# Patient Record
Sex: FEMALE | Race: BLACK OR AFRICAN AMERICAN | Employment: OTHER | URBAN - METROPOLITAN AREA
[De-identification: names, ages, dates, MRNs, and addresses within clinical notes are randomized per-mention and may not be internally consistent; named-entity substitution may affect disease eponyms.]

---

## 2018-09-28 ENCOUNTER — HOSPITAL ENCOUNTER (OUTPATIENT)
Dept: PHYSICAL THERAPY | Age: 58
Discharge: HOME | End: 2018-09-28
Payer: OTHER GOVERNMENT

## 2018-09-28 VITALS — BODY MASS INDEX: 41.59 KG/M2 | WEIGHT: 265 LBS | HEIGHT: 67 IN

## 2018-09-28 PROCEDURE — 97162 PT EVAL MOD COMPLEX 30 MIN: CPT

## 2018-09-28 PROCEDURE — 97110 THERAPEUTIC EXERCISES: CPT

## 2018-09-28 PROCEDURE — 97140 MANUAL THERAPY 1/> REGIONS: CPT

## 2018-09-28 NOTE — PROGRESS NOTES
[facility]  Physical Therapy Lymphedema Clinic  [address] [facility], [address]  [facility], [address]    INITIAL EVALUATION    NAME: Jarvis Shen AGE: 63 y.o. 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 She had a mastectomy and then a complete dissection of R axillary lymph nodes. 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  Pt was treated with chemotherapy and will have radiation in the near future. She had chemo and radiation back in 2005 as well. She was seen by her surgeon this am and he was able to compress and drain an area along R anterior chest.  This area has been covered with sterile dry dressing and nugauze packing and pt was instructed to change the dressing as it requires. Pt was issued a velcro compression sleeve for the R UE but not any hand pieces from the [place]. She brought the garment in for sizing today. Lymphedema Life Impact Scale: Score of 4 and impairment percentage of 6%. See scanned document. Past Medical History: No past medical history on file. No past surgical history on file. Current Medications:    No current outpatient prescriptions on file. No current facility-administered medications for this encounter. see scanned list below: Allergies: Allergies not on file.   None per patient report  Prior Level of Function/Social/Work History/Personal factors and/or comorbidities impacting plan of care: retired, recovering from 2 recent surgeries, dealing with an open wound that requires packing. Living Situation: pt's mother lives with her but requires care, pt does have her son at home who can assist if needed. Trainable Caregiver?: son possibly   Self-care/ADLs: mod I     Mobility: I   Sleeping Arrangement:  In a bed, likes to sleep on her R side but has been trying to lie on L side and on her back with her R UE elevated   Adaptive Equipment Owned: none   Other: n/a  Previous Therapy:  none  Compression/Lymphedema Equipment:  Pt was issued 2 farrow lite OTS arm velcro sleeve in size medium. Pt did not get any hand pieces however. These were issued by the [place]. SUBJECTIVE:   Pt reports that her R upper posterior arm is numb and it might be swollen. It is hard for her to tell due to decrease sensation in that area. Patients goals for therapy: \"to learn about lymphedema. \"    OBJECTIVE DATA SUMMARY:   EXAMINATION/PRESENTATION/DECISION MAKING:   Pain:  Pain Scale 1: Numeric (0 - 10)     Pain Intensity 1: 3     Pain Location 1: Chest     Pain Orientation 1: Right, Anterior     Patient Stated Pain Goal: 0       Self-Care and ADLs:  Grooming: Modified independent  Bathing: Modified independent for showers   UB Dressing: Modified independent  LB Dressing: Modified independent    Don/Doff Shoes/Socks: Independent  Toileting: Independent    Other: Pt fatigued from recent surgeries and chemotherapy      Skin and Tissue Assessment:  Dermal Status:  [x]   Intact-R UE []  Dry   []  Tenuous [] Flaky   [x]  Wound/lesion present-R breast.  Areas was seen by MD this am and expressed then packed with nugauze and sterile dressing to cover. Pt was instructed to change entire dressing and packing as the dressing gets wet.    [x]  Scars-R anterior chest and axilla   []  Dermatitis    Texture/Consistency:  [x]  Boggy-R upper arm []  Pitting Edema   []  Brawny []  Combination   []  Fibrotic/Woody Pigmentation/Color Change:  [x]  Normal []  Hemosiderin   []  Red []  Erythematous   []  Hyperpigmented []  Hyperlipodermatosclerosis   Anomalies:  []  Lymphorrhea []  Vesicles   []  Petechiae []  Warty Vercusis   []  Bullae []  Papilloma   Circulatory:  []  OSMANY []  Varicosities:   []  Pulses []  Vascular studies ruled out DVT in past   []  DVT History    Nails:  [x]   Normal  []   Fungus  Stemmers Sign: negative  Height:  Height: 5' 7\" (170.2 cm)  Weight:  Weight: 120.2 kg (265 lb)   BMI:  BMI (calculated): 41.5  (36 or greater: adversely affecting lymphedema)  Wound/Ulcer:  R breast incisional wound that is requiring a packing          Wound Pain:   3/10  Volumetric Measurements: UEs  Right:  3,358 mL Left:  3,167 mL   % Difference: 6% Dominance: R   (See scanned graph)  Range of Motion: Pt with limited end range of R shoulder flexion and abduction  Strength: WFL   Sensation:    [] Intact    [x] Impaired-R posterior upper arm  Mobility:    Bed/Chair Mobility:  Modified independent  Transfers:  Modified independent    Sitting Balance:  normal Standing Balance:  good   Gait:  Independent  Wheelchair Mobility:  Not tested   Endurance:  fair Stairs:  Not tested       Safety:  Patient is alert and oriented:  yes   Safety awareness:  good   Fall Risk?:  low   Patient given written fall prevention handout: Yes   Precautions:  Standard lymphedema precautions to include avoiding blood pressure readings, injections and IVs or other procedures/acts that could lead to broken skin on affected area, and avoiding excessive heat, resistive activity or altitude without compression garment    Functional Measure:   Based on the above components, the patient evaluation is determined to be of the following complexity level: MEDIUM    Evaluation Time: 11:30-12:15 pm    TREATMENT PROVIDED:   1. Treatment description:  Therapeutic Exercise and Procedure: Patient instructed in activity restrictions and exercise guidelines.   Therapist demonstrated deep abdominal breathing and a remedial lymphedema range of motion exercise program to be done in sitting. Patient was able to complete the routine times 5 reps and deep abdominal breathing with fair performance. Patient has been asked to complete breathing exercises and the decongestive exercise routine daily. Pt has been asked to use elevation as able and to walk up to 30 minutes per day as her endurance level will allow. Treatment time:  12:15-12:45 pm  Minutes: 30    2. Treatment description:  Manual Therapy: Patient instructed in skin care principles and anatomy of the lymphatic system. Therapist able to demonstrate manual lymphatic drainage techniques for the drainage of R UE and chest and skin care protocol. Pt states that her MD referred her for breast prosthesis and mastectomy bras. Appropriate bras were shown to patient and she was given the vendors in town:  [employer] and [employer]. Pt's compression garments was evaluated and it is the correct size. Unfortunately though is that is did not come with a hand piece. Pt has been advice not to use the garment without a handpiece as hand swelling could result. Pt desires to get into a sleeve and glove and not the velcro system if possible. Treatment time:  12:45-1 pm  Minutes: 15    ASSESSMENT:   Galilea Smith. Radha Miranda is a 63 y.o. female who presents with stage 0-1 secondary lymphedema of her R chest and UE. She has a wound that is requiring medical assistance and skin care at home. She is a risk for increase swelling with complete dissection of her R axilla and current history of breast CA. Patient will benefit from complete decongestive therapy (CDT) including manual lymphatic drainage (MLD) technique, short-stretch textile bandages/compression system to decongest limb, and kinesiotaping as appropriate.   Patient will receive instruction in proper skin care to recognize signs/symptoms of and prevent infection, therapeutic exercise, and self-MLD for independent home program and restorative lymphatic performance. This care is medically necessary due to the infection risk with lymphedema and to improve functional activities. CDT is necessary to resolve swelling to allow patient to return to wearing normal clothes and prevent worsening of symptoms, such as infections or hospitalizations. Patient will be independent with home program strategies to allow improved ADL ability and mobility and to allow patient to return to greatest functional independence. Rehabilitation potential is considered to be fair. Factors which may influence rehabilitation potential include recent surgery, upcoming radiation therapy on top of previous radiation and complete dissection of R axilla lymph nodes. Patient will benefit from 6-10 physical therapy visits over 12 weeks to optimize improvement in these areas. PLAN OF CARE:   Recommendations and Planned Interventions:  Manual lymph drainage/complete decongestive therapy  Multi-layer compression bandaging (short-stretch)  Compression garment fitting/provision  Lymphedema therapeutic exercise  AROM/PROM/Strength/Coordination  Self-care training  Functional mobility training  Education in skin care and lymphedema precautions  Self-MLD education per home program  Self-bandaging education per home program  Caregiver education as needed  Wound care as needed     GOALS  Short term goals  Time frame: to meet by 10/31/2018  1. Patient will demonstrate knowledge of signs/symptoms of infections/cellulitis and be independent in skin care to prevent cellulitis. 2.  Patient will demonstrate independence in lymphedema home program of therapeutic exercises to improve circulation and decongest limb to improve ADLs. 3.  Patient will participate in the selection process to allow ordering of home compression system (daytime, nighttime garments and pump as needed).      Long term goals  Time frame: 12/24/2018  1. Pt will show improvement in Lymphedema Life Impact Scale by decreasing impairment percentage to under 4% and thus allow improvement in patient's quality of life. 2.  Patient will be independent with don/doff of compression system and use in order to prevent reaccumulation of fluid at discharge. 3.  Pt will be independent in self-MLD and show stable limb volumes showing decongestion and pt. ready for transition to independent restorative phase of lymphedema therapy. Patient has participated in goal setting and agrees to work toward plan of care. Patient was instructed to call if questions or concerns arise. Thank you for this referral.  Maged Teresa, PT   Time Calculation: 90 mins    TREATMENT PLAN EFFECTIVE DATES:   9/28/2018 TO 12/24/2018  I have read the above plan of care for Kerrie Shanks. Lázaro Downey. I certify the above prescribed services are required by this patient and are medically necessary.   The above plan of care has been developed in conjunction with the lymphedema/physical therapist.       Physician Signature: ____________________________________Date:______________                                           Salvador Uribe NP

## 2018-10-02 ENCOUNTER — HOSPITAL ENCOUNTER (OUTPATIENT)
Dept: PHYSICAL THERAPY | Age: 58
Discharge: HOME | End: 2018-10-02
Payer: OTHER GOVERNMENT

## 2018-10-02 PROCEDURE — 97110 THERAPEUTIC EXERCISES: CPT

## 2018-10-02 PROCEDURE — 97140 MANUAL THERAPY 1/> REGIONS: CPT

## 2018-10-02 NOTE — ROUTINE PROCESS
[facility]  Physical Therapy Lymphedema Clinic  [address] [facility], [address], [facility].    LYmphedema Therapy  Visit: 2    [x]                  Daily note               []                 30 day/10th visit progress note    NAME: Galilea Miranda  DATE: 10/2/2018    GOALS  Short term goals  Time frame: to meet by 10/31/2018  1. Patient will demonstrate knowledge of signs/symptoms of infections/cellulitis and be independent in skin care to prevent cellulitis. Continued with education in skin care and infection prevention. Patient has open wound on R breast that continues to drain. Patient following recommendations per MD.   2.  Patient will demonstrate independence in lymphedema home program of therapeutic exercises to improve circulation and decongest limb to improve ADLs. Patient has been educated in the active range of motion routine and today educated in the stick routine. Patient encouraged to perform her exercises daily. 3.  Patient will participate in the selection process to allow ordering of home compression system (daytime, nighttime garments and pump as needed). Patient able to be measured for Juzo Soft Arm Sleeve and Medi [place] Glove/gauntlet today. Order has to be done through the [place] so information sent to patient's doctors office. Patient also interested in a pump trial so we will try to arrange this on an upcoming visit. Patient will set up an appointment with a local vendor for her truncal compression garments as recommended.      Long term goals  Time frame: 12/24/2018  1. Pt will show improvement in Lymphedema Life Impact Scale by decreasing impairment percentage to under 4% and thus allow improvement in patient's quality of life. 2.  Patient will be independent with don/doff of compression system and use in order to prevent reaccumulation of fluid at discharge. Educated in don/doff of day time compression garments.  Patient will need to work with her products when they arrive. 3.  Pt will be independent in self-MLD and show stable limb volumes showing decongestion and pt. ready for transition to independent restorative phase of lymphedema therapy. Began education in the Self MLD packet today and patient given a written packet to take home so that she can follow daily. Reviewed her volumes taken on evaluation. Girths of R UE taken today (see measurement section). Will continue to monitor. SUBJECTIVE REPORT: Patient arrived today eager to proceed with measuring and ordering her compression garments. Patient reporting that her wound on R breast is draining often and dressing changes are occurring 3 times a day. Pain:6/10 R breast wound   Gait:   [x]  Independent gait without any assistive device for community distances  ADLs: Independent   Treatment Response:   [x]   Patient reviewed packet received at evaluation  [x]   Patient completed home program as prescribed  []   Patient not fully compliant with home program to date  [x]   Patient measured compression garment arrival  Function:  [x]   Patient requiring less assistance with completion of home program  []   ADLs are requiring less assistance   []   Patient able to return to work/leisure activities  Lymphedema Life Impact Scale:Deferred  Weight: Deferred  TREATMENT AND OBJECTIVE DATA SUMMARY:   Patient/Family Education:      Educated in skin care: [x]   Skin care products  [x]   Hygiene  [x]  Prevention of cellulitis  [x]   Wound care (per MD recommendations)     Educated in exercise: [x]   Walking program  []   Carmen ball routine  [x]   Stick routine  [x]   ROM routine     Instructed in self MLD: Written sequence given and reviewed with patient as well as demonstration and instruction during MLD portion of the session.      Instructed in don/doff of compression system:   []   Multi layer bandage (MLB) donning principles and wear precautions  [x]   Day garments (Juzo Soft 2001 Arm Sleeve/Medi Gauntlet and Glove)  []   Night garments     Therapeutic Activity 0 minutes   Treatment time: 0  Functional Mobility: Independent   Fall risk: Low     Therapeutic Exercise/Procedure 10 minutes   Treatment time: 11:30am-11:40am  Carmen ball exercise program: Deferred   Stick exercise program: Educated patient on the stick routine while she was trying the [employer] and [employer] in the clinic. Patient able to perform the routine x 5 reps. Patient to follow this routine in the home as tolerated. Free exercises/ROM: Patient has been performing daily following her handouts. Home program: Patient to perform daily to BID:  [x]   Skin care  [x]   Deep abdominal breathing  [x]   Exercise routine  [x]   Walking program  [x]   Rest in supine   []   Compression bandage  [x]   Compression garments  [x]   Vasopneumatic device (will set up a demo)  [x]   Wound care  [x]   Self MLD  [x]   Bring supplies to each therapy visit  []   Purchase necessary supplies  []   Weight loss program  []   Follow up with     Rationale: Exercise will increase the lymph angiomotoricity and tissue pressure of the skin and thus decrease swelling. Modalities 0 minutes   Treatment time: 0  Vasopneumatic pump: Will set up a pump trial on an upcoming visit. Manual Lymphatic Drainage (MLD)  70 minutes   Treatment time: 11:10am-11:30am and 11:40am-12:30pm   Area to decongest:    [x] UE          [x]  Right     []  Left      [] Trunk    [] LE             []  Right     []  Left      [] Trunk     Sequence used and effectiveness: [x] Secondarysequence for upper extremity withinvolvement    [] Secondary/Primary sequence for lower extremities with / without trunk involvement     [x] Modifications were made to manual lymph drainage sequence to exclude cervical techniques secondary to patient's medical     [x] Self MLD sequence/techniques/hand strokes   Skin/wound care/debridement: Intact R UE.    Wound/lesion present-R breast. Area covered and patient is keeping area covered and treated per MD recommendations. She reports that it drains frequently and that she typically has some one change the dressing 3 times a day. Upper/Lower extremity compression: Discussed options for compression garments. Patient has 2 velcro sleeves that she has already received from the [place], but they did not come with hand pieces and she does not like the product. Today patient was measured for sleeve/glove/gauntlet per her choice and will fit best at this time in [address] [place] Arm Sleeve Size 4 Max/Long with silicone border 62-78 mmHg in black (2) and Medi [place] Glove 20-30 mmHg size 4 in black and Medi [place] Gauntlet 20-30 mmHg size 4. All products have to be ordered through prosthetics at the [place]. The above garments are what we are recommending for patient. [facility] and order[facility]fit products. Ideally our clinic therapist would be more involved in this process. Kinesiotaping: Deferred   Girth/Volume measurement: Reviewed results of volumetric measurements taken on evaluation. TOTAL TREATMENT 80 mins     Circumferential Limb Measurements:  Affected Side: R UE   Right (cm)   Base 3rd finger 6.5      Palm 20.9      Wrist 18      Mid Forearm 26.8      Elbow 29.5      Axilla 38.5 (mid bicep=33.5)      Length 46.5            ASSESSMENT:   Treatment effectiveness and tolerance: Patient continues with open wound on the R breast that requires frequent dressing changes. Patient reports that she is following recommendations from her MD on treating the area. She was able to proceed with measuring for her day time garments today, learning Self MLD and exercise. Will have patient return next week for follow up to see how she is progressing with her home program. Will try to set up a pump trial on an upcoming visit. Progress toward goals: See goal section.       PLAN OF CARE:   Changes to the plan of care: Continue with plan of care established at evaluation. Order for garments sent to physicians office because patient's order has to be processed by prosthetics at the [place]. Frequency: []  2 times a week  [x]  Weekly  []  Biweekly  []  Monthly   Other: Measured for garments today. Patient's insurance requires garments be ordered by physician through the prosthetics department at the [place] so this may cause a delay. Fax of requested garments sent to Ольга Ng NP.         Wilfrido Esqueda, PTA, CLT

## 2018-10-10 ENCOUNTER — HOSPITAL ENCOUNTER (OUTPATIENT)
Dept: PHYSICAL THERAPY | Age: 58
Discharge: HOME | End: 2018-10-10
Payer: OTHER GOVERNMENT

## 2018-10-10 VITALS — HEIGHT: 67 IN | WEIGHT: 256 LBS | BODY MASS INDEX: 40.18 KG/M2

## 2018-10-10 PROCEDURE — 97140 MANUAL THERAPY 1/> REGIONS: CPT

## 2018-10-10 PROCEDURE — 97016 VASOPNEUMATIC DEVICE THERAPY: CPT

## 2018-10-10 NOTE — ROUTINE PROCESS
[facility]  [address], [address]    LYmphedema Therapy  Visit: 3    [x]                  Daily note               []                 30 day/10th visit progress note    NAME: Kerrie Shanks. Lázaro Shayan  DATE: 10/10/2018  GOALS  Short term goals  Time frame: to meet by 10/31/2018  1. Patient will demonstrate knowledge of signs/symptoms of infections/cellulitis and be independent in skin care to prevent cellulitis. Continued with education in skin care and infection prevention. Patient has open wound on R breast that continues to drain. Patient following recommendations per MD.   Pt to call MD if wound is not improving. 2.  Patient will demonstrate independence in lymphedema home program of therapeutic exercises to improve circulation and decongest limb to improve ADLs. Patient has been educated in the active range of motion routine and in the stick routine. Patient encouraged to perform her exercises daily. 3.  Patient will participate in the selection process to allow ordering of home compression system (daytime, nighttime garments and pump as needed). Patient able to be measured for [employer] Soft Arm Sleeve and Medi [place] Glove/gauntlet. Order has been sent to the [place]. Patient completed a demo of the [address] [facility] [employer] today with good results. Recommend flexitouchplus for home use.      Long term goals  Time frame: 12/24/2018  1. Pt will show improvement in Lymphedema Life Impact Scale by decreasing impairment percentage to under 4% and thus allow improvement in patient's quality of life. 2.  Patient will be independent with don/doff of compression system and use in order to prevent reaccumulation of fluid at discharge. Educated in don/doff of day time compression garments. Patient will need to work with her products when they arrive.    3.  Pt will be independent in self-MLD and show stable limb volumes showing decongestion and pt. ready for transition to independent restorative phase of lymphedema therapy. Education in the Self MLD packet continues and pre and post measurement with the flexitouch treatment are included below. SUBJECTIVE REPORT: Patient reports that the wound on her breast continues to need daily wound care and it appears to not be closing in per her report. She has not heard anything about her garments. Pain: minimal discomfort in her R chest area close to the wound site. Gait:   [x]  Independent gait without any assistive device for community distances  ADLs: Independent   Treatment Response:   [x]   Patient reviewed packet received at evaluation  [x]   Patient completed home program as prescribed  []   Patient not fully compliant with home program to date  [x]   Patient awaiting compression day garments  Function:  [x]   Patient requiring less assistance with completion of home program  []   ADLs are requiring less assistance   []   Patient able to return to work/leisure activities  Lymphedema Life Impact Scale:Deferred  Weight: deferred today  TREATMENT AND OBJECTIVE DATA SUMMARY:   Patient/Family Education:      Educated in skin care: [x]   Skin care products  [x]   Hygiene  [x]  Prevention of cellulitis  [x]   Wound care (per MD recommendations)     Educated in exercise: [x]   Walking program  []   Carmen ball routine  [x]   Stick routine  [x]   ROM routine     Instructed in self MLD: Written sequence given and reviewed with patient as well as demonstration and instruction during MLD portion of the session. Instructed in don/doff of compression system:   []   Multi layer bandage (MLB) donning principles and wear precautions  [x]   Day garments (Juzo Soft 2001 Arm Sleeve/Medi Gauntlet and Glove)  [x]   Night garments deferred today, flexitouch use and benefits discussed today.      Therapeutic Activity 0 minutes   Treatment time: 0  Functional Mobility: Independent   Fall risk: Low Therapeutic Exercise/Procedure minutes   Treatment time: n/a  Carmen ball exercise program: Deferred   Stick exercise program: Pt has performed in the clinic and has the written handouts to follow at home. Free exercises/ROM: Patient has performed in the clinic and has the written handouts to follow at home. Home program: Patient to perform daily to BID:  [x]   Skin care  [x]   Deep abdominal breathing  [x]   Exercise routine  [x]   Walking program  [x]   Rest in supine   []   Compression bandage  [x]   Compression garments  [x]   Vasopneumatic device (when available in the home setting)  [x]   Wound care  [x]   Self MLD  [x]   Bring supplies to each therapy visit  []   Purchase necessary supplies  []   Weight loss program  [x]   Follow up with MD for R breast wound     Rationale: Exercise will increase the lymph angiomotoricity and tissue pressure of the skin and thus decrease swelling. Modalities 50 minutes   Treatment time: 10:15-10:55 am  Vasopneumatic pump: Pt was educated in use and benefit of the flexitouch plus device. The rep was present and was able to fit the device to the patient. Pre and post measurements were taken (see below)  and good benefit noted from use of the device. Pt was positioned in supine with her R UE elevated on 2 pillows. MLD performed on short neck and deep abd breathing exercises done. Recommended the device for home use.      Manual Lymphatic Drainage (MLD)  55 minutes   Treatment time: 10:00-10:20 am and 10:55-11:30 am   Area to decongest:    [x] UE          [x]  Right     []  Left      [] Trunk    [] LE             []  Right     []  Left      [] Trunk     Sequence used and effectiveness: [x] Secondarysequence for upper extremity with trunk involvement    [] Secondary/Primary sequence for lower extremities with / without trunk involvement     [x] Modifications were made to manual lymph drainage sequence to exclude cervical techniques secondary to patient's medical [x] Self MLD sequence/techniques/hand strokes   Skin/wound care/debridement: Intact R UE. Wound/lesion present-R breast. Area covered and patient is keeping area covered and treated per MD recommendations. She reports that it drains frequently and that she typically has someone change the dressing 3 times a day. Upper/Lower extremity compression: Discussed options for compression garments. Patient has 2 velcro sleeves that she has already received from the [facility], but they did not come with hand pieces and she does not like the product. Patient was measured for sleeve/glove/gauntlet per her choice and will fit best at this time in 2347 [employer] Arm Sleeve Size 4 Max/Long with silicone border 54-68 mmHg in black (2) and Medi [place] Glove 20-30 mmHg size 4 in black and Medi [place] Gauntlet 20-30 mmHg size 4. All products have to be ordered through prosthetics at the [facility]. The above garments are what we are recommending for patient. [facility], [facility] has [facility] to her PCP and they will then refer to prosthetics. The garments will need to be sent to the patient or to this clinic so that proper fit and eval of the effectiveness can be determined. Kinesiotaping: Deferred   Girth/Volume measurement: See pre and post pump girth measurements below. TOTAL TREATMENT 90 mins     Circumferential Limb Measurements:  Affected Side: R UE   Right (cm) pre pump Right (cm) post pump   Base 3rd finger 6.7 6.5   Palm 20.6 20.2   Wrist 18.1 17.8   Mid Forearm 25.8 25.5   Elbow 29.1 28.3   Axilla 33.8 33.6   Chest 111.1    Hips 141    Length 71 inseam              ASSESSMENT:   Treatment effectiveness and tolerance: Patient doing well with therapy. Pt will need to see MD if wound does not improve. Waiting on first set of day compression garments. Pt did well with the pump trial and one has been recommended for home use. Progress toward goals: See goal section for details.       PLAN OF CARE: Changes to the plan of care: Continue with plan of care established at evaluation. Order for garments sent to PCP for processing. Frequency: []  2 times a week  [x]  Weekly  []  Biweekly  []  Monthly   Other: Measured for garments. . Patient's insurance requires garments be ordered by PCP through the prosthetics department at the [place] so this may cause a delay.  Fax of requested garments sent to Dr. Jim Quintero- lynsey  team.       Shari Pruett, PT, CLT

## 2018-10-17 ENCOUNTER — HOSPITAL ENCOUNTER (OUTPATIENT)
Dept: PHYSICAL THERAPY | Age: 58
Discharge: HOME | End: 2018-10-17
Payer: OTHER GOVERNMENT

## 2018-10-17 PROCEDURE — 97140 MANUAL THERAPY 1/> REGIONS: CPT

## 2018-10-17 NOTE — ROUTINE PROCESS
[facility]  Physical Therapy [facility]ymphed[facility]  [address], [address], [place]  [address].    LYmphedema Therapy  Visit: 4    [x]                  Daily note               []                 30 day/10th visit progress note    NAME: Ирина Shen  DATE: 10/17/2018    GOALS  Short term goals  Time frame: to meet by 10/31/2018  1. Beatris Rowell will demonstrate knowledge of signs/symptoms of infections/cellulitis and be independent in skin care to prevent cellulitis. Continued with education in skin care and infection prevention. Patient has open wound on R breast that continues to drain. Patient following recommendations per MD.  Pt to call MD if wound is not improving. 2.  Patient will demonstrate independence in lymphedema home program of therapeutic exercises to improve circulation and decongest limb to improve ADLs. Patient has been educated in the active range of motion routine and stick routine. Patient encouraged to perform her exercises daily. 3.  Patient will participate in the selection process to allow ordering of home compression system (daytime, nighttime garments and pump as needed). Patient able to be measured for [employer] Soft Arm Sleeve and Medi [place] Glove/gauntlet. Order has been sent to the [place]. Patient awaiting garments.       Long term goals  Time frame: 12/24/2018  1.  Pt will show improvement in Lymphedema Life Impact Scale by decreasing impairment percentage to under 4% and thus allow improvement in patient's quality of life. 2.  Patient will be independent with don/doff of compression system and use in order to prevent reaccumulation of fluid at discharge. Educated in don/doff of day time compression garments. Patient will need to work with her products when they arrive.    3.  Pt will be independent in self-MLD and show stable limb volumes showing decongestion and pt. ready for transition to independent restorative phase of lymphedema therapy. Education in the Self MLD packet continued during MLD session today. Girths taken of R UE today see measurement section of note for details.                 SUBJECTIVE REPORT: Patient reports that she was supposed to be receiving wound care, but no one has contacted her. She currently is caring for her own wound and packing the area herself. She reports that some days she is having to change the bandage more than once. Patient reports that she has not received her garments to date, but was going to call as see if she can find out the status. Pain:Discomfort in her R chest area close to the wound site. Patient reports pain 4/10 on the lateral chest. Patient reported at the end of session that her pain was reduced to 2/10.    Gait:   [x]  Independent gait without any assistive device for community distances  ADLs: Independent   Treatment Response:   [x]   Patient reviewed packet received at evaluation  [x]   Patient completed home program as prescribed  []   Patient not fully compliant with home program to date  [x]   Patient awaiting compression day garments  Function:  [x]   Patient requiring less assistance with completion of home program  []   ADLs are requiring less assistance   []   Patient able to return to work/leisure activities  Lymphedema Life Impact Scale:Deferred  Weight: Deferred today      TREATMENT AND OBJECTIVE DATA SUMMARY:   Patient/Family Education:        Educated in skin care: [x]   Skin care products  [x]   Hygiene  [x]  Prevention of cellulitis  [x]   Wound care (per MD recommendations)      Educated in exercise: [x]   Walking program  []   Carmen ball routine  [x]   Stick routine  [x]   ROM routine      Instructed in self MLD: Written sequence given and reviewed with patient as well as demonstration and instruction during MLD portion of the session.      Instructed in don/doff of compression system:    []   Multi layer bandage (MLB) donning principles and wear precautions  [x] Day garments (Juzo Soft 2001 Arm Sleeve/Medi Gauntlet and Glove)  [x]   Night garments deferred today, flexitouch use and benefits, patient awaiting device for home use.       Therapeutic Activity 0 minutes   Treatment time: 0  Functional Mobility: Independent   Fall risk: Low      Therapeutic Exercise/Procedure 0 minutes   Treatment time: n/a  Carmen ball exercise program: Deferred   Stick exercise program: Pt has performed in the clinic and has the written handouts to follow at home. Free exercises/ROM: Patient has performed in the clinic and has the written handouts to follow at home. Home program: Patient to perform daily to BID:  [x]   Skin care  [x]   Deep abdominal breathing  [x]   Exercise routine  [x]   Walking program  [x]   Rest in supine   []   Compression bandage  [x]   Compression garments  [x]   Vasopneumatic device (when available in the home setting)  [x]   Wound care  [x]   Self MLD  [x]   Bring supplies to each therapy visit  []   Purchase necessary supplies  []   Weight loss program  [x]   Follow up with MD for R breast wound      Rationale: Exercise will increase the lymph angiomotoricity and tissue pressure of the skin and thus decrease swelling.      Modalities 0 minutes   Treatment time: 0  Vasopneumatic pump: Pt was educated in use and benefit of the flexitouch plus device and able to have a demo last visit. Recommended the device for home use.  [employer] will process the order for patient.       Manual Lymphatic Drainage (MLD)  75 minutes   Treatment time: 12:40pm-13:55pm  Area to decongest:    [x] UE          [x]  Right     []  Left      [] Trunk     [] LE             []  Right     []  Left      [] Trunk      Sequence used and effectiveness: [x] Secondary sequence for upper extremity with trunk involvement with focus on the trunk and R UE     [] Secondary/Primary sequence for lower extremities with / without trunk involvement      [x] Modifications were made to manual lymph drainage sequence to exclude cervical techniques secondary to patient's medical      [x] Self MLD sequence/techniques/hand strokes   Skin/wound care/debridement: Intact R UE. Wound/lesion present-R breast. Area covered and patient is keeping area covered and treated per MD recommendations. She reports that it drains frequently and that she typically has someone change the dressing 3 times a day. She reports that the plan was for her to have wound care address this area, but has not been contacted by anyone to date. Upper/Lower extremity compression: Discussed options for compression garments. Patient has 2 velcro sleeves that she has already received from the [place], but they did not come with hand pieces and she does not like the product. Patient was measured for sleeve/glove/gauntlet per her choice and will fit best at this time in [address] [facility] Arm Sleeve Size 4 Max/Long with silicone border 12-52 mmHg in black (2) and Medi [place] Glove 20-30 mmHg size 4 in black and Medi [place] Gauntlet 20-30 mmHg size 4. All products have to be ordered through prosthetics at the [place]. The above garments are what we are recommending for patient. [facility], the order has been submitted to her PCP and they will then refer to prosthetics. The garments will need to be sent to the patient or to this clinic so that proper fit and eval of the effectiveness can be determined. Patient reports that she will follow up on the status of her order before next visit. Kinesiotaping: Patient educated on use of kinesio tape and reports not having any contraindications for application. Today a \"Y\" was placed R lateral trunk to L posterior axilla for drainage. Patient was comfortable in the tape and patient and her caregiver were educated in how to remove tape if needed.     Girth/Volume measurement: Girths taken today see below.       TOTAL TREATMENT 75  mins      Circumferential Limb Measurements:  Affected Side: R UE Right (cm)   Base 3rd finger 6.2      Palm 20.4      Wrist 17      Mid Forearm 26      Elbow 24.5      Axilla 38. 4(mid biceps 34.0)              ASSESSMENT:   Treatment effectiveness and tolerance: Patient is awaiting her compression garments to arrive. The process is taking longer as it most be done through the [place] prosthetics department. Patient continues to work on her home program as instructed. Patient reporting today that she feels that her wound is not healing and she was supposed to be getting wound care and this has not been set up to date and she is caring for her wound herself. Patient should return to MD for follow up for her wound if not improving. Patient plans to follow up in the clinic next week. Progress toward goals: See goal section for details.           PLAN OF CARE:   Changes to the plan of care: Continue with plan of care established at evaluation. Frequency: []  2 times a week  [x]  Weekly  []  Biweekly  []  Monthly   Other: Patient's insurance requires garments be ordered by PCP through the prosthetics department at the [place] so this is causing a delay.  Fax of requested garments sent to Dr. Byron Sears- [facility] 1 team on previous visit.         Wilfrido Esqueda, PTA, CLT

## 2018-10-24 ENCOUNTER — HOSPITAL ENCOUNTER (OUTPATIENT)
Dept: PHYSICAL THERAPY | Age: 58
Discharge: HOME | End: 2018-10-24
Payer: OTHER GOVERNMENT

## 2018-10-24 VITALS — WEIGHT: 259 LBS | HEIGHT: 67 IN | BODY MASS INDEX: 40.65 KG/M2

## 2018-10-24 PROCEDURE — 97110 THERAPEUTIC EXERCISES: CPT

## 2018-10-24 PROCEDURE — 97140 MANUAL THERAPY 1/> REGIONS: CPT
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
## 2018-11-01 ENCOUNTER — HOSPITAL ENCOUNTER (OUTPATIENT)
Dept: PHYSICAL THERAPY | Age: 58
Discharge: HOME | End: 2018-11-01
Payer: OTHER GOVERNMENT

## 2018-11-01 PROCEDURE — 97140 MANUAL THERAPY 1/> REGIONS: CPT

## 2018-11-01 NOTE — ROUTINE PROCESS
[facility]  Physical Therapy [facility]ymp[facility]  [address], [address], [place]  [address].    LYmphedema Therapy  Visit: 6    [x]                  Daily note               []                 30 day Reassessment/progress note    NAME: Starr Ribera  DATE: 11/1/2018    GOALS  Short term goals  Time frame: to meet by 10/31/2018  1. Gt Anderson will demonstrate knowledge of signs/symptoms of infections/cellulitis and be independent in skin care to prevent cellulitis. Continued with education in skin care and infection prevention. Patient has open wound on R breast that continues to drain. Patient following recommendations per MD. Patient reports that wound care now coming to her  home on Monday, Wednesday and Friday and she performs wound care on the other days. Will extend goal to 12/24/18 to continue to monitor. 2.  Patient will demonstrate independence in lymphedema home program of therapeutic exercises to improve circulation and decongest limb to improve ADLs. Patient has been educated in the active range of motion routine and stick routine. Patient encouraged to perform her exercises daily. Will educate on [employer] routine on future visit. Will extend goal to 12/24/18 as patient has not received her compression garments and exercises will be progressed when she receives them. 3.  Patient will participate in the selection process to allow ordering of home compression system (daytime, nighttime garments and pump as needed). Patient able to be measured for Juzo Soft Arm Sleeve and Medi [place] Glove/gauntlet. Order has been sent to the [place]. Patient awaiting garments and has not heard from them. Patient reports that she will contact the [place] to see the status. Patient received her vaso-pneumatic pump and now is awaiting her set up for training.  Will extend goal to 12/24/18 as we have not determined if she will need more products.      Long term goals  Time frame: 12/24/2018  1.  Pt will show improvement in Lymphedema Life Impact Scale by decreasing impairment percentage to under 4% and thus allow improvement in patient's quality of life. Deferred today. 2.  Patient will be independent with don/doff of compression system and use in order to prevent reaccumulation of fluid at discharge. Educated in don/doff of day time compression garments. Patient will need to work with her products when they arrive. 3.  Pt will be independent in self-MLD and show stable limb volumes showing decongestion and pt. ready for transition to independent restorative phase of lymphedema therapy. Education in the Self MLD packet continued during MLD session today. Full volumes taken today to reveal that patient has gained 62 ml on the R UE and gained 123 ml on the L UE. Since evaluation patient has lost 96 ml on the R UE and 61 ml on the L UE. Patient is awaiting compression so these numbers should improve when she is able to start using her products as recommended.              SUBJECTIVE REPORT: Patient arrived today with her Flexitouch Pump. She was confused about whether she was to bring it to the clinic or not. Explained to patient that she will need to have the pump training in the home so the [employer]  can make sure the garments and pump settings are working appropriately. Patient reports that she has not heard anything in regards to her garments. There has been a delay in garment arrival due to patient's insurance requires products to be obtained differently and has to be ordered through the prosthetics department of the [place]. Patient reports that she will try to contact them to check the status prior to her next visit. Pain: Patient reports soreness in her lateral chest, but feels that it has improved since last visit.    Gait:    [x]  Independent gait without any assistive device for community distances  ADLs: Independent   Treatment Response:   [x]   Patient reviewed packet received at evaluation  [x]   Patient completed home program as prescribed  []   Patient not fully compliant with home program to date  [x]   Patient awaiting compression day garments  Function:  [x]   Patient requiring less assistance with completion of home program  []   ADLs are requiring less assistance   []   Patient able to return to work/leisure activities  Lymphedema Life Impact Scale: Deferred  Weight: Deferred      TREATMENT AND OBJECTIVE DATA SUMMARY:   Patient/Family Education:        Educated in skin care: [x]   Skin care products  [x]   Hygiene  [x]  Prevention of cellulitis  [x]   Wound care (per MD recommendations/receiving wound care 3 times a week)      Educated in exercise: [x]   Walking program  []   Carmen ball routine  [x]   Stick routine  [x]   ROM routine      Instructed in self MLD: Written sequence given and reviewed with patient as well as demonstration and instruction during MLD portion of the session.      Instructed in don/doff of compression system:    []   Multi layer bandage (MLB) donning principles and wear precautions  [x]   Day garments (Juzo Soft 2001 Arm Sleeve/Medi Gauntlet and Glove)  [x]   Night garments deferred today, flexitouch received and she will be using this daily once set up has been done in the home)      Therapeutic Activity 0 minutes   Treatment time: 0  Functional Mobility: Independent   Fall risk: Low      Therapeutic Exercise/Procedure 0 minutes   Treatment time: 0  Carmen ball exercise program: Deferred   Stick exercise program: Pt has performed in the clinic and has the written handouts to follow at home. Free exercises/ROM: Patient received gentle stretching while having MLD today. Continues with a decreased flexion and abduction on the R UE currently 160 degrees. Internal and External is within normal limits but patient feels stretch at end range.    Encouraged her to continue to focus on her exercises so that she will be able to have good range of motion for her radiation when she is able to receive it. Home program: Patient to perform daily to BID:  [x]   Skin care  [x]   Deep abdominal breathing  [x]   Exercise routine  [x]   Walking program  [x]   Rest in supine   []   Compression bandage  [x]   Compression garments  [x]   Vasopneumatic device (received and is waiting for in home training)  [x]   Wound care  [x]   Self MLD  [x]   Bring supplies to each therapy visit  []   Purchase necessary supplies  [x]   Weight loss program  []   Follow up       Rationale: Exercise will increase the lymph angiomotoricity and tissue pressure of the skin and thus decrease swelling.      Modalities 0 minutes   Treatment time: 0  Vasopneumatic pump: Patient received her pump and is now awaiting in home training and set up.       Manual Lymphatic Drainage (MLD)  90 minutes   Treatment time: 12:30pm-2:00pm  Area to decongest:    [x] UE          [x]  Right     []  Left      [] Trunk     [] LE             []  Right     []  Left      [] Trunk      Sequence used and effectiveness: [x] Secondary sequence for upper extremity with trunk involvement with focus on the trunk and R UE     [] Secondary/Primary sequence for lower extremities with / without trunk involvement      [x] Modifications were made to manual lymph drainage sequence to exclude cervical techniques secondary to patient's medical      [x] Self MLD sequence/techniques/hand strokes   Skin/wound care/debridement: Intact R UE. Wound/lesion present-R breast. Area covered and patient is keeping area covered. She reports that she is now receiving wound care treatments in the home scheduled 3 days a week. Did not remove dressing today to assess, but patient reports that she feels that it is reducing in size. Lateral chest swelling and firmness improving with use of swell spot/chip foam and kinesio tape. Fibrotic areas around wound softening.     Upper/Lower extremity compression: Awaiting arrival of Gaby Brooks 2001 Arm Sleeve Size 4 Max/Long with silicone border 08-00 mmHg in black (2) and Medi [place] Glove 20-30 mmHg size 4 in black and Medi [place] Gauntlet 20-30 mmHg size 4. All products have to be ordered through prosthetics at the [place]. The above garments are what we are recommending for patient. [facility], the order has been submitted to her PCP and they will then refer to prosthetics. The garments will need to be sent to the patient or to this clinic so that proper fit and eval of the effectiveness can be determined. Kinesiotaping: Patient educated on use of kinesio tape and reports not having any contraindications for application. \"Y\" was placed R lateral trunk to L posterior axilla for drainage and added a \"Y\" from proximal lateral trunk to lower/abdomen/inguinal region. Patient was comfortable in the tape and patient and her caregiver were educated in how to remove tape if needed. Patient feels that the tape is helping. Girth/Volume measurement:  ull volumes taken today to reveal that patient has gained 62 ml on the R UE and gained 123 ml on the L UE. Since evaluation patient has lost 96 ml on the R UE and 61 ml on the L UE.       TOTAL TREATMENT 75  mins      Circumferential Limb Measurements:  Affected Side: R UE  Full volumes taken today see above for details. ASSESSMENT:   Treatment effectiveness and tolerance: Patient is awaiting her compression garments to arrive. The process is taking longer as it most be done through the [place] prosthetics department. Patient continues to work on her home program as instructed. Patient now having wound care nurse come to her home three days a week and per patient she feels that wound is improving. Patient continues to report fatigue, but feels that this is improved from last visit. Patient to follow up next week for follow up.  Will have to assess frequency next visit if her garments have not arrived as they are needed for her to progress towards goals. Progress toward goals: See goal section for details.           PLAN OF CARE:   Changes to the plan of care: Continue with plan of care established at evaluation. Frequency: []  2 times a week  [x]  Weekly  []  Biweekly  []  Monthly   Other: Patient's insurance requires garments be ordered by PCP through the prosthetics department at the [facility] so this is causing a delay. Fax of requested garments sent to Dr. Byron menon 1 team on previously.     Patient needs to improve diet and stop smoking to improve her overall health and improve wound healing.          Wilfrido Esqueda, PTA, CLT

## 2018-11-08 ENCOUNTER — HOSPITAL ENCOUNTER (OUTPATIENT)
Dept: PHYSICAL THERAPY | Age: 58
Discharge: HOME | End: 2018-11-08
Payer: OTHER GOVERNMENT

## 2018-11-08 PROCEDURE — 97140 MANUAL THERAPY 1/> REGIONS: CPT

## 2018-11-08 NOTE — ROUTINE PROCESS
[facility]  Physical Therapy Lymphedema Clinic  [address], [address], [facility] 22.    LYmphedema Therapy  Visit: 8    [x]                  Daily note               []                 30 day Reassessment/progress note    NAME: Jarvis Rodriguez  DATE: 11/8/2018    GOALS  Short term goals (ALL STGS extended to 12/24/18)  1.  Patient will demonstrate knowledge of signs/symptoms of infections/cellulitis and be independent in skin care to prevent cellulitis. Continued with education in skin care and infection prevention. Patient has open wound on R breast that has improved since receiving in wound care 3 times a week. Patient reports that the area is closing and today able to see that the wound appearance as improved and patient going to [place] today for first radiation consultation. Will continue to monitor. 2.  Patient will demonstrate independence in lymphedema home program of therapeutic exercises to improve circulation and decongest limb to improve ADLs. Patient has been educated in the active range of motion routine and stick routine. Patient encouraged to perform her exercises daily. Will educate on [employer] routine on future visit. Will extend goal to 12/24/18 as patient has not received her compression garments and exercises will be progressed when she receives them. Today tolerated VINCE stretching and able to improve range of motion in R shoulder post session. 3.  Patient will participate in the selection process to allow ordering of home compression system (daytime, nighttime garments and pump as needed). Patient able to be measured for Juzo Soft Arm Sleeve and Medi [place] Glove/gauntlet. Order has been sent to the [place]. Patient awaiting garments and has not received garments to date. Patient received her vaso-pneumatic pump and received her in home training and has begun using the pump daily as instructed.  Patient to return to the clinic when her garments arrive. Long term goals  Time frame: 12/24/2018  1.  Pt will show improvement in Lymphedema Life Impact Scale by decreasing impairment percentage to under 4% and thus allow improvement in patient's quality of life. Deferred today. 2.  Patient will be independent with don/doff of compression system and use in order to prevent reaccumulation of fluid at discharge. Educated in don/doff of day time compression garments. Patient will need to work with her products when they arrive. 3.  Pt will be independent in self-MLD and show stable limb volumes showing decongestion and pt. ready for transition to independent restorative phase of lymphedema therapy. Education in the Self MLD packet continued during MLD session today. Full volumes taken today to reveal that patient has lost 226 ml on the R UE since last visit and lost a total of 323 ml on the R UE since evaluation. Patient's percent difference on evaluation on 9/28/18 was 6% and today it is -2%. Patient is awaiting compression garments at this time, but her use of the Flexitouch Plus has been a good addition to her home program in reducing her volumes and addressing her trunk swelling.              SUBJECTIVE REPORT: Patient arrived today and reports that she still has not received her compression garments. Patient did have her Flexitouch Plus set up in the home and has been using her pump daily. Patient reports that she is scheduled to be at the [facility] for her first radiation consultation today after her visit in our clinic. She is eager to get her radiation started now that her wound is healing. Pain: Patient reports soreness in her lateral chest continues to improve, but today noted to present with limitations in the axillary region and with shoulder range of motion. Post MLD and Axillary web stretching patient received relief and improved range of motion.    Gait:    [x]  Independent gait without any assistive device for community distances  ADLs: Independent   Treatment Response:   [x]   Patient reviewed packet received at evaluation  [x]   Patient completed home program as prescribed and began using her pump. []   Patient not fully compliant with home program to date  [x]   Patient awaiting compression day garments  Function:  [x]   Patient requiring less assistance with completion of home program  []   ADLs are requiring less assistance   []   Patient able to return to work/leisure activities  Lymphedema Life Impact Scale: Deferred  Weight: 259.2 lbs this is down from evaluation weight of 265.4 lbs      TREATMENT AND OBJECTIVE DATA SUMMARY:   Patient/Family Education:        Educated in skin care: [x]   Skin care products  [x]   Hygiene  [x]  Prevention of cellulitis  [x]   Wound care (per MD recommendations/receiving wound care 3 times a week)      Educated in exercise: [x]   Walking program  []   Carmen ball routine  [x]   Stick routine  [x]   ROM routine      Instructed in self MLD: Written sequence given and reviewed with patient as well as demonstration and instruction during MLD portion of the session.      Instructed in don/doff of compression system:    []   Multi layer bandage (MLB) donning principles and wear precautions  [x]   Day garments (Juzo Soft 2001 Arm Sleeve/Medi Gauntlet and Glove)  [x]   Night garments deferred today, flexitouch received and she is using it daily      Therapeutic Activity 0 minutes   Treatment time: 0  Functional Mobility: Independent   Fall risk: Low      Therapeutic Exercise/Procedure 0 minutes   Treatment time: 0  Carmen ball exercise program: Deferred   Stick exercise program: Pt has performed in the clinic and has the written handouts to follow at home. Free exercises/ROM: Patient received gentle stretching while having MLD today. Focused on axillary web. Continues with a decreased flexion and abduction on the R  degrees on arrival, post treatment she was able to perform both to 180. Internal and External is within normal limits but patient feels stretch at end range. Encouraged her to continue to focus on her exercises so that she will be able to have good range of motion for her radiation when she is able to receive it. Patient goes today for radiation consultation. Home program: Patient to perform daily to BID:  [x]   Skin care  [x]   Deep abdominal breathing  [x]   Exercise routine  [x]   Walking program  [x]   Rest in supine   []   Compression bandage  [x]   Compression garments (awaiting arrival)  [x]   Vasopneumatic device (received in home  Training and is using the pump daily)  [x]   Wound care  [x]   Self MLD  [x]   Bring supplies to each therapy visit  []   Purchase necessary supplies  [x]   Weight loss program/improve nutrition  []   Follow up       Rationale: Exercise will increase the lymph angiomotoricity and tissue pressure of the skin and thus decrease swelling.      Modalities 0 minutes   Treatment time: 0  Vasopneumatic pump: Patient received her pump and received in home training and set up. Patient reports using it daily.       Manual Lymphatic Drainage (MLD)  60 minutes   Treatment time: 12:30pm-13:30pm  Area to decongest:    [x] UE          [x]  Right     []  Left      [] Trunk     [] LE             []  Right     []  Left      [] Trunk      Sequence used and effectiveness: [x] Secondary sequence for upper extremity with trunk involvement with focus on the trunk and R UE  Focused also on VINCE stretching and mobilizations during MLD session.      [] Secondary/Primary sequence for lower extremities with / without trunk involvement      [x] Modifications were made to manual lymph drainage sequence to exclude cervical techniques secondary to patient's medical      [x] Self MLD sequence/techniques/hand strokes   Skin/wound care/debridement: Intact R UE. Wound/lesion present-R breast. Area covered and patient is keeping area covered.  She reports that she is now receiving wound care treatments in the home scheduled 3 days a week. Able to loosen  dressing today to assess,without removing packing. And visually improved dramatically since she has been receiving wound care 3 times a week. Lateral chest swelling and firmness improving with use of swell spot/chip foam and kinesio tape. Fibrotic areas around wound softening. Upper/Lower extremity compression: Awaiting arrival of [employer] Soft 2001 Arm Sleeve Size 4 Max/Long with silicone border 56-30 mmHg in black (2) and Medi [place] Glove 20-30 mmHg size 4 in black and Medi [place] Gauntlet 20-30 mmHg size 4. All products have to be ordered through prosthetics at the [place]. The above garments are what we are recommending for patient. [facility] the [facility] has been submitted to her PCP and they will then refer to prosthetics. The garments will need to be sent to the patient or to this clinic so that proper fit and eval of the effectiveness can be determined. Kinesiotaping: Patient educated on use of kinesio tape and reports not having any contraindications for application. Previous tape removed and the following reapplied. \"Y\" was placed R lateral trunk to L posterior axilla for drainage and added a \"Y\" from proximal lateral trunk to lower/abdomen/inguinal region. Patient was comfortable in the tape and patient and her caregiver were educated in how to remove tape if needed. Patient feels that the tape is helping. Girth/Volume measurement: Full volumes taken today to reveal that patient has lost 226 ml on the R UE since last visit and lost a total of 323 ml on the R UE since evaluation. Patient's percent difference on evaluation on 9/28/18 was 6% and today it is -2%.       TOTAL TREATMENT 60  mins      Circumferential Limb Measurements:  Affected Side: R UE  Full volumes taken today see above for details.          ASSESSMENT:   Treatment effectiveness and tolerance: Patient is awaiting her compression garments to arrive. The process is taking longer as it most be done through the [place] prosthetics department. Patient continues to work on her home program as instructed and now using her Flexitouch Plus pump daily as recommended. Patient now having wound care nurse come to her home three days a week and per patient she feels that wound is improving and she is able to go today for her radiation consultation. Patient doing very well and we are now just waiting for compression garments. Will have patient return towards the end of the month or when her garments arrive for fitting. Progress toward goals: See goal section for details.           PLAN OF CARE:   Changes to the plan of care: Continue with plan of care established at evaluation. Frequency: []  2 times a week  []  Weekly  [x]  Biweekly (awaiting garment arrival)  [x]  Monthly   Other: Patient's insurance requires garments be ordered by PCP through the prosthetics department at the [place] so this is causing a delay.  Fax of requested garments sent to Dr. Belen Healy- yellow 1 team.    Patient needs to improve diet and stop smoking to improve her overall health and improve wound healing.          Wilfrido Esqueda, PTA, CLT

## 2018-12-31 ENCOUNTER — HOSPITAL ENCOUNTER (OUTPATIENT)
Dept: PHYSICAL THERAPY | Age: 58
Discharge: HOME | End: 2018-12-31
Payer: OTHER GOVERNMENT

## 2018-12-31 VITALS — WEIGHT: 254.6 LBS | BODY MASS INDEX: 39.96 KG/M2 | HEIGHT: 67 IN

## 2018-12-31 PROCEDURE — 97140 MANUAL THERAPY 1/> REGIONS: CPT

## 2018-12-31 NOTE — ROUTINE PROCESS
[facility]  Physical Therapy Lymphedema Clinic  [address], [address], [place]  [address].    LYmphedema Therapy  Visit: 8    []                  Daily note               [x]                 90 day Reassessment/Progress Note with Updated Plan of Care    NAME: Robb Mendenhall  DATE: 12/31/2018  Patient has not been seen in the clinic since 11/8/18 as there has been a delay in patient receiving her compression garments. Patient called and came in today for follow up because she received compression garments and needed them fitted. Will need to update patient's plan of care. GOALS  Short term goals extended to 12/24/18:  1.  Patient will demonstrate knowledge of signs/symptoms of infections/cellulitis and be independent in skin care to prevent cellulitis. Patient now with healed wound on her chest and is able to demonstrate knowledge of of signs and symptoms of infection/cellulits. She is currently independent with her skin care. Goal Met 12/31/18)  2.  Patient will demonstrate independence in lymphedema home program of therapeutic exercises to improve circulation and decongest limb to improve ADLs. Patient has been educated in the active range of motion routine and stick routine. Patient encouraged to perform her exercises daily. Will educate on [employer] routine on future visit. Focus on visit today was on fitting her new products and MLD. Will extend this goal to work on educating in home exercises next visit. Goal extended to 3/21/19. 3.  Patient will participate in the selection process to allow ordering of home compression system (daytime, nighttime garments and pump as needed). Patient has received her Juzo Soft Arm Sleeves and Medi [place] gauntlets. Originally 2 sleeves and one gauntlet and one glove was ordered. Patient received 3 sleeves and 3 gauntlets. [facility] to see [facility] patient did not receive the glove that was ordered.     Patient received her vaso-pneumatic pump and received her in home training and has begun using the pump daily as instructed. Patient reports today that she feels that she needs to have retraining in the fit of the garments as her son assists her and some how the garments are not fitting properly. Will reach out to [employer] so they can assist. At this time all garments have been ordered it is just trying to obtain the glove for patient that was ordered. Goal Met 12/31/18. Long term goals to meet by 12/24/2018:  (Extend all to 3/21/19 as they were not met today)  1.  Pt will show improvement in Lymphedema Life Impact Scale by decreasing impairment percentage to under 4% and thus allow improvement in patient's quality of life. Patient scored a 19 on the LLIS with a percent impairment of 28%. This score is higher than previous scores. Will monitor as patient is nearing completion of her radiation treatments. 2.  Patient will be independent with don/doff of compression system and use in order to prevent reaccumulation of fluid at discharge. Educated in don/doff of day time compression garments. Patient able to demonstrate that she could don and doff her new products independently in the clinic. 3.  Pt will be independent in self-MLD and show stable limb volumes showing decongestion and pt. ready for transition to independent restorative phase of lymphedema therapy. Education in the Self MLD packet continued during MLD session today. Patient also has a Flexi-touch Plus vaso-pneumatic pump in the home setting. Full volumes taken today to reveal that patient has lost 102 ml on the R UE and 173 ml on the L UE since last visit. Patient has lost a total of 424 ml on the R UE and 234 ml on the L UE since evaluation 9/28/18. Patient's percent difference on evaluation was 6% and today it is 0%.   Will continue to monitor volumes as she transitions into her new compression garments.              SUBJECTIVE REPORT: Patient returned today with her compression garments to be fitted. She reports that she only has a few more radiation treatments and that her chest wound is completely closed. She reports that she is very fatigued and fell asleep at times during MLD. Patient did receive her garments that were ordered through the [place], but she did not receive a glove. Will contact [facility] to see when she will receive this garment. Pain: Patient reports that she has pain in her R hip. She did not rate pain reports that it is often uncomfortable. Gait:    [x]  Independent gait without any assistive device for community distances  ADLs: Independent   Treatment Response:   [x]   Patient reviewed packet received at evaluation  [x]   Patient completed home program as prescribed   []   Patient not fully compliant with home program to date  [x]   Patient fitted with her new compression day garments  Function:  [x]   Patient requiring less assistance with completion of home program  []   ADLs are requiring less assistance   []   Patient able to return to work/leisure activities  Lymphedema Life Impact Scale: Deferred  Weight: 254.6 lbs down from last visit weight of 259.2 lbs and down from evaluation weight of 265.4 lbs      TREATMENT AND OBJECTIVE DATA SUMMARY:   Patient/Family Education:        Educated in skin care: [x]   Skin care products  [x]   Hygiene  [x]  Prevention of cellulitis  []   Wound care      Educated in exercise: [x]   Walking program  []   Carmen ball routine  [x]   Stick routine  [x]   ROM routine      Instructed in self MLD: Written sequence given and reviewed with patient as well as demonstration and instruction during MLD portion of the session.      Instructed in don/doff of compression system:    []   Multi layer bandage (MLB) donning principles and wear precautions  [x]   Day garments (Juzo Soft 2001 Arm Sleeve/Medi Gauntlet and Glove)   Patient received 3 sleeves and 3 gauntlets.  Will need to contact [facility] to see why she did not receive her glove. [x]   Night garments deferred today, flexitouch received and she is using it daily, but needs to have garments and settings reset as there are some fit issues and patient reporting some shortness of breath with use. Advised to discontinue use until we can have someone from [employer] look at the device.       Therapeutic Activity 0 minutes   Treatment time: 0  Functional Mobility: Independent   Fall risk: Low      Therapeutic Exercise/Procedure 0 minutes   Treatment time: 0  Carmen ball exercise program: Deferred   Stick exercise program: Pt has performed in the clinic and has the written handouts to follow at home. Free exercises/ROM: Patient has performed in the clinic and has the written handouts to follow at home. Home program: Patient to perform daily to BID:  [x]   Skin care  [x]   Deep abdominal breathing  [x]   Exercise routine  [x]   Walking program  [x]   Rest in supine   []   Compression bandage  [x]   Compression garments (fitted with Juzo Sleeves and Medi Gauntlets) Awaiting glove. [x]   Vasopneumatic device (received in home  Training and is using the pump daily, but there are some concerns with fit and settings on the device so asked patient to discontinue use until [employer] rep/ is able to assist)  [x]   Wound care  [x]   Self MLD  [x]   Bring supplies to each therapy visit  []   Purchase necessary supplies  [x]   Weight loss program/improve nutrition  []   Follow up       Rationale: Exercise will increase the lymph angiomotoricity and tissue pressure of the skin and thus decrease swelling.      Modalities 0 minutes   Treatment time: 0  Vasopneumatic pump: Patient received her pump and received in home training and set up. Patient reports that she does not feel that the trunk portion is fitting her correctly and also the arm/shoulder piece needs to be reset. Patient also reporting some shortness of breath with use.  Advised patient to hold use of the device until someone from [facility] can trouble shoot her issues and adjust her device properly.       Manual Lymphatic Drainage (MLD)   80 minutes   Treatment time: 11:10am-12:30pm  Area to decongest:    [x] UE          [x]  Right     []  Left      [x] Trunk     [] LE             []  Right     []  Left      [] Trunk      Sequence used and effectiveness: [x] Secondary sequence for upper extremity with trunk involvement with focus on the trunk and R UE    [] Secondary/Primary sequence for lower extremities with / without trunk involvement      [x] Modifications were made to manual lymph drainage sequence to exclude cervical techniques secondary to patient's medical      [x] Self MLD sequence/techniques/hand strokes   Skin/wound care/debridement: Intact R UE. Wound/lesion present-R breast now closed. Lateral chest swelling and firmness improving. Fibrotic areas around wound/scars continue. Upper/Lower extremity compression: Patient received her Juzo Soft 2001 Arm Sleeve Size 4 Max/Long with silicone border 16-85 mmHg in black (3 ordered 2) and Medi [place] Gauntlet 20-30 mmHg size 4 (3 ordered 2). Patient did not receive her Medi [place] Glove 20-30 mmHg size 4 in black, so we will contact [place]. All products have to be ordered through prosthetics at the [place], which has caused a delay in receiving her products that were ordered in October. Patient was educated on the wear and care of the garments and how to properly don/doff the garments. Patient is to work with compression daily and to return in 1-2 weeks for follow up so that we can assess how she is managing the garments. Kinesiotaping: Deferred today as patient is in the end stages of radiation and her skin is sensitive. Girth/Volume measurement: Full volumes taken today to reveal that patient has lost 102 ml on the R UE and 173 ml on the L UE since last visit.  Patient has lost a total of 424 ml on the R UE and 234 ml on the L UE since evaluation 9/28/18. Patient's percent difference on evaluation was 6% and today it is 0%.       TOTAL TREATMENT 60  mins      Circumferential Limb Measurements:  Affected Side: R UE  Full volumes taken today see above for details. Girths:  Axilla-107.5 cm  Chest-112.0 cm  Waist-119 cm  Hips- 143 cm        ASSESSMENT:   Treatment effectiveness and tolerance: Patient arrived today for the first time since 11/8/18. There has been a delay in receiving her garments and she received them and came in today for fitting and follow up. Patient has been able to make some gains since last visit with her chest wound closed, volumes down and almost completed her radiation treatments. Patient reports that she is very fatigued most days. She is still working with her Flexitouch Plus, but reporting some garment issues and also some shortness of breath at times with use, so advised to hold use of the pump until someone from [facility] can better assess the device. Patient should be able to discharge from our services once we resolve some of her garment concerns and finalize her home program. Patient's main concern at this time is her abdominal swelling R > L and R hip pain. Will continue to monitor patient and have her follow up in 1-2 weeks. Patient aware that she can contact the clinic if she is having any issues with her garments prior to her next visit. Progress toward goals: See goal section for details. STG 1+3 met. Will extend STG 2. LTGs will need to be extended as well.            PLAN OF CARE:   Changes to the plan of care: Patient requires an updated plan of care as she had a delay in obtaining her compression garments.     Frequency: []  2 times a week  [x]  Weekly  [x]  Biweekly   []  Monthly   Other: [facility] to [facility] to find out why patient did not receive her Medi Glove that was ordered.         Wilfrido YUAN Patch, PTA, CLT  Genaro Roberts, PT, CLT-JOSÉ    TREATMENT PLAN EFFECTIVE DATES: 12/25/2018 TO 3/21/2019  I have read the above plan of care for Ronak Bridges. I certify the above prescribed services are required by this patient and are medically necessary.   The above plan of care has been developed in conjunction with the lymphedema/physical therapist.   Physician Signature: ____________________________________________________        Date: ____________        Lily Griggs NP

## 2019-01-18 ENCOUNTER — HOSPITAL ENCOUNTER (OUTPATIENT)
Dept: PHYSICAL THERAPY | Age: 59
Discharge: HOME | End: 2019-01-18

## 2019-01-18 NOTE — PROGRESS NOTES
Lymphedema Clinic  Pt was unable to be seen in the clinic due to a hospitalization last week at the [place] for acute on chronic CHF. Pt called our clinic yesterday requesting an appointment but did not mention that she was in the hospital.  Will need to obtain a new order from the MD to resume therapy and pt was told this.   Milly Manuel, PT, [employer]

## 2022-09-14 ENCOUNTER — APPOINTMENT (OUTPATIENT)
Dept: GENERAL RADIOLOGY | Age: 62
End: 2022-09-14
Attending: PHYSICIAN ASSISTANT
Payer: OTHER GOVERNMENT

## 2022-09-14 ENCOUNTER — HOSPITAL ENCOUNTER (EMERGENCY)
Age: 62
Discharge: HOME | End: 2022-09-14
Attending: EMERGENCY MEDICINE
Payer: OTHER GOVERNMENT

## 2022-09-14 VITALS
BODY MASS INDEX: 40.64 KG/M2 | DIASTOLIC BLOOD PRESSURE: 82 MMHG | WEIGHT: 259.48 LBS | TEMPERATURE: 97.6 F | OXYGEN SATURATION: 98 % | RESPIRATION RATE: 16 BRPM | SYSTOLIC BLOOD PRESSURE: 137 MMHG | HEART RATE: 63 BPM

## 2022-09-14 DIAGNOSIS — M25.562 ACUTE PAIN OF LEFT KNEE: Primary | ICD-10-CM

## 2022-09-14 PROCEDURE — 73562 X-RAY EXAM OF KNEE 3: CPT

## 2022-09-14 PROCEDURE — 74011250636 HC RX REV CODE- 250/636: Performed by: PHYSICIAN ASSISTANT

## 2022-09-14 PROCEDURE — 99284 EMERGENCY DEPT VISIT MOD MDM: CPT

## 2022-09-14 PROCEDURE — 96372 THER/PROPH/DIAG INJ SC/IM: CPT

## 2022-09-14 RX ORDER — NAPROXEN 500 MG/1
500 TABLET ORAL 2 TIMES DAILY WITH MEALS
Qty: 20 TABLET | Refills: 0 | Status: SHIPPED | OUTPATIENT
Start: 2022-09-14 | End: 2022-09-24

## 2022-09-14 RX ORDER — KETOROLAC TROMETHAMINE 30 MG/ML
30 INJECTION, SOLUTION INTRAMUSCULAR; INTRAVENOUS
Status: COMPLETED | OUTPATIENT
Start: 2022-09-14 | End: 2022-09-14

## 2022-09-14 RX ADMIN — KETOROLAC TROMETHAMINE 30 MG: 30 INJECTION, SOLUTION INTRAMUSCULAR; INTRAVENOUS at 13:35

## 2022-09-14 NOTE — ED PROVIDER NOTES
Annika Bowman. Dereck Knowles is a 65 y.o. female  who presents by private vehicle to ER with c/o knee pain. Patient reports pain started a few days ago. Patient denies any injury. Patient reports pain worsening radiating into upper leg. Denies any numbness, tingling or weakness. Denies fever or chills. She specifically denies any fevers, chills, nausea, vomiting, chest pain, shortness of breath, headache, rash, diarrhea, abdominal pain, urinary/bowel changes, sweating or weight loss. PCP: Collin Hancock MD   PMHx significant for: Past Medical History:  No date: COPD (chronic obstructive pulmonary disease) ([facility]  No date: Diabetes ([facility]  No date: Emphysema lung ([facility]  No date: Fibromyalgia  No date: Heart failure ([facility]  No date: Hypercholesteremia  No date: TIA (transient ischemic attack)   PSHx significant for: Past Surgical History:  No date: HX  SECTION  No date: HX HAMMER TOE REPAIR  No date: HX SVT ABLATION  Social Hx: Tobacco use: Social History    Tobacco Use      Smoking status: Every Day        Packs/day: 1.00        Types: Cigarettes      Smokeless tobacco: Never  ; EtOH use: The patient states she drinks 0 per week.; Illicit Drug use: Allergies:   -- Pcn [Penicillins] -- Swelling    There are no other complaints, changes or physical findings at this time. Past Medical History:   Diagnosis Date    COPD (chronic obstructive pulmonary disease) ([facility]     Diabetes ([facility] [facility]     Emphysema lung (HCC)     Fibromyalgia     Heart failure (HCC)     Hypercholesteremia     TIA (transient ischemic attack)        Past Surgical History:   Procedure Laterality Date    HX  SECTION      HX HAMMER TOE REPAIR      HX SVT ABLATION           History reviewed. No pertinent family history.     Social History     Socioeconomic History    Marital status: SINGLE     Spouse name: Not on file    Number of children: Not on file    Years of education: Not on file    Highest education level: Not on file Occupational History    Not on file   Tobacco Use    Smoking status: Every Day     Packs/day: 1.00     Types: Cigarettes    Smokeless tobacco: Never   Substance and Sexual Activity    Alcohol use: Never    Drug use: Never    Sexual activity: Not on file   Other Topics Concern    Not on file   Social History Narrative    Not on file     Social Determinants of Health     Financial Resource Strain: Not on file   Food Insecurity: Not on file   Transportation Needs: Not on file   Physical Activity: Not on file   Stress: Not on file   Social Connections: Not on file   Intimate Partner Violence: Not on file   Housing Stability: Not on file         ALLERGIES: Pcn [penicillins]    Review of Systems   Constitutional:  Negative for activity change, chills and fever. HENT:  Negative for congestion, rhinorrhea and sore throat. Respiratory:  Negative for shortness of breath. Cardiovascular:  Negative for chest pain and leg swelling. Gastrointestinal:  Negative for abdominal pain, diarrhea, nausea and vomiting. Genitourinary:  Negative for dysuria, vaginal bleeding and vaginal discharge. Musculoskeletal:  Positive for arthralgias. Negative for myalgias. Neurological:  Negative for dizziness. Psychiatric/Behavioral:  Negative for confusion. All other systems reviewed and are negative. Vitals:    09/14/22 1213   BP: (!) 152/77   Pulse: 64   Resp: 20   Temp: 97.6 °F (36.4 °C)   SpO2: 95%   Weight: 117.7 kg (259 lb 7.7 oz)            Physical Exam  Constitutional:       Appearance: Normal appearance. She is well-developed. HENT:      Head: Normocephalic and atraumatic. Right Ear: External ear normal.      Left Ear: External ear normal.      Nose: Nose normal.      Mouth/Throat:      Pharynx: No oropharyngeal exudate. Eyes:      General: Lids are normal.         Right eye: No discharge. Left eye: No discharge. Conjunctiva/sclera: Conjunctivae normal.   Neck:      Thyroid: No thyromegaly. Trachea: No tracheal deviation. Cardiovascular:      Rate and Rhythm: Normal rate and regular rhythm. Heart sounds: Normal heart sounds. Pulmonary:      Effort: Pulmonary effort is normal.      Breath sounds: Normal breath sounds. Abdominal:      General: Bowel sounds are normal.      Palpations: Abdomen is soft. Musculoskeletal:         General: Normal range of motion. Cervical back: Normal range of motion. Comments: Left knee, mild TTP over patellar tendon, no swelling, erythema noted. No joint effusion, full ROM with no pain. LLE is NVI. No calf tenderness. Skin:     General: Skin is warm and dry. Neurological:      Mental Status: She is alert and oriented to person, place, and time. Psychiatric:         Judgment: Judgment normal.        MDM  Number of Diagnoses or Management Options  Acute pain of left knee  Diagnosis management comments: Assesment/Plan- 65 y.o. No chief complaint on file. differential includes: sprain, fracture, effusion. Labs and imaging reviewed with no acute findings, no signs of septic joint or gout, no exam findings concerning for DVT. Patients symptoms improved with toradol. Recommend PCP follow up. Patient educated on reasons to return to the ED.          Amount and/or Complexity of Data Reviewed  Tests in the radiology section of CPT®: reviewed and ordered    Risk of Complications, Morbidity, and/or Mortality  Presenting problems: low  Diagnostic procedures: low  Management options: low    Patient Progress  Patient progress: improved         Procedures

## 2025-08-19 ENCOUNTER — HOSPITAL ENCOUNTER (EMERGENCY)
Facility: HOSPITAL | Age: 65
Discharge: HOME | End: 2025-08-19
Attending: EMERGENCY MEDICINE
Payer: OTHER GOVERNMENT

## 2025-08-19 ENCOUNTER — APPOINTMENT (OUTPATIENT)
Facility: HOSPITAL | Age: 65
End: 2025-08-19
Payer: OTHER GOVERNMENT

## 2025-08-19 VITALS
DIASTOLIC BLOOD PRESSURE: 68 MMHG | TEMPERATURE: 98.4 F | HEART RATE: 100 BPM | OXYGEN SATURATION: 96 % | HEIGHT: 67 IN | SYSTOLIC BLOOD PRESSURE: 128 MMHG | RESPIRATION RATE: 18 BRPM | BODY MASS INDEX: 40.64 KG/M2

## 2025-08-19 DIAGNOSIS — M25.562 ACUTE PAIN OF LEFT KNEE: Primary | ICD-10-CM

## 2025-08-19 PROCEDURE — 73562 X-RAY EXAM OF KNEE 3: CPT

## 2025-08-19 PROCEDURE — 6370000000 HC RX 637 (ALT 250 FOR IP): Performed by: EMERGENCY MEDICINE

## 2025-08-19 PROCEDURE — 99283 EMERGENCY DEPT VISIT LOW MDM: CPT

## 2025-08-19 RX ORDER — DULOXETIN HYDROCHLORIDE 20 MG/1
20 CAPSULE, DELAYED RELEASE ORAL DAILY
COMMUNITY

## 2025-08-19 RX ORDER — TORSEMIDE 20 MG/1
20 TABLET ORAL DAILY
COMMUNITY

## 2025-08-19 RX ORDER — TIOTROPIUM BROMIDE 18 UG/1
18 CAPSULE ORAL; RESPIRATORY (INHALATION) DAILY
COMMUNITY

## 2025-08-19 RX ORDER — MULTIVIT-MIN/FERROUS GLUCONATE 9 MG/15 ML
15 LIQUID (ML) ORAL DAILY
COMMUNITY

## 2025-08-19 RX ORDER — EXEMESTANE 25 MG/1
25 TABLET ORAL DAILY
COMMUNITY

## 2025-08-19 RX ORDER — CETIRIZINE HYDROCHLORIDE 10 MG/1
10 TABLET ORAL DAILY
COMMUNITY

## 2025-08-19 RX ORDER — FLUTICASONE PROPIONATE AND SALMETEROL 100; 50 UG/1; UG/1
1 POWDER RESPIRATORY (INHALATION) EVERY 12 HOURS
COMMUNITY

## 2025-08-19 RX ORDER — BUPROPION HYDROCHLORIDE 100 MG/1
100 TABLET ORAL 2 TIMES DAILY
COMMUNITY

## 2025-08-19 RX ORDER — ACETAMINOPHEN 500 MG
500 TABLET ORAL EVERY 6 HOURS PRN
COMMUNITY

## 2025-08-19 RX ORDER — CYANOCOBALAMIN (VITAMIN B-12) 500 MCG
TABLET ORAL
COMMUNITY

## 2025-08-19 RX ORDER — PANTOPRAZOLE SODIUM 40 MG/1
40 FOR SUSPENSION ORAL
COMMUNITY

## 2025-08-19 RX ORDER — ALBUTEROL SULFATE 90 UG/1
2 INHALANT RESPIRATORY (INHALATION) EVERY 6 HOURS PRN
COMMUNITY

## 2025-08-19 RX ORDER — DOCUSATE SODIUM 100 MG/1
CAPSULE, LIQUID FILLED ORAL 2 TIMES DAILY
COMMUNITY

## 2025-08-19 RX ORDER — CYCLOBENZAPRINE HCL 10 MG
10 TABLET ORAL 3 TIMES DAILY PRN
COMMUNITY

## 2025-08-19 RX ORDER — PREDNISONE 20 MG/1
20 TABLET ORAL DAILY
Qty: 5 TABLET | Refills: 0 | Status: SHIPPED | OUTPATIENT
Start: 2025-08-19 | End: 2025-08-24

## 2025-08-19 RX ORDER — OXYCODONE HYDROCHLORIDE 5 MG/1
5 TABLET ORAL
Refills: 0 | Status: COMPLETED | OUTPATIENT
Start: 2025-08-19 | End: 2025-08-19

## 2025-08-19 RX ORDER — PRAVASTATIN SODIUM 40 MG
40 TABLET ORAL DAILY
COMMUNITY

## 2025-08-19 RX ORDER — LOSARTAN POTASSIUM 100 MG/1
50 TABLET ORAL DAILY
COMMUNITY

## 2025-08-19 RX ORDER — SPIRONOLACTONE 25 MG/1
25 TABLET ORAL DAILY
COMMUNITY

## 2025-08-19 RX ORDER — ALLOPURINOL 100 MG/1
100 TABLET ORAL DAILY
COMMUNITY

## 2025-08-19 RX ORDER — FERROUS SULFATE 325(65) MG
325 TABLET ORAL
COMMUNITY

## 2025-08-19 RX ORDER — INSULIN GLARGINE 100 [IU]/ML
INJECTION, SOLUTION SUBCUTANEOUS NIGHTLY
COMMUNITY

## 2025-08-19 RX ADMIN — OXYCODONE HYDROCHLORIDE 5 MG: 5 TABLET ORAL at 20:03

## 2025-08-19 ASSESSMENT — PAIN DESCRIPTION - ORIENTATION: ORIENTATION: LEFT

## 2025-08-19 ASSESSMENT — PAIN SCALES - GENERAL: PAINLEVEL_OUTOF10: 8

## 2025-08-19 ASSESSMENT — PAIN DESCRIPTION - LOCATION: LOCATION: KNEE

## 2025-08-19 ASSESSMENT — PAIN DESCRIPTION - DESCRIPTORS: DESCRIPTORS: SHARP
